# Patient Record
Sex: FEMALE | Race: WHITE | NOT HISPANIC OR LATINO | Employment: OTHER | URBAN - METROPOLITAN AREA
[De-identification: names, ages, dates, MRNs, and addresses within clinical notes are randomized per-mention and may not be internally consistent; named-entity substitution may affect disease eponyms.]

---

## 2022-06-09 ENCOUNTER — OFFICE VISIT (OUTPATIENT)
Dept: OBSTETRICS AND GYNECOLOGY | Facility: CLINIC | Age: 62
End: 2022-06-09

## 2022-06-09 VITALS
DIASTOLIC BLOOD PRESSURE: 80 MMHG | SYSTOLIC BLOOD PRESSURE: 128 MMHG | WEIGHT: 175.4 LBS | BODY MASS INDEX: 25.98 KG/M2 | HEIGHT: 69 IN

## 2022-06-09 DIAGNOSIS — N95.2 ATROPHY OF VAGINA: ICD-10-CM

## 2022-06-09 DIAGNOSIS — Z01.411 ENCOUNTER FOR GYNECOLOGICAL EXAMINATION WITH ABNORMAL FINDING: Primary | ICD-10-CM

## 2022-06-09 DIAGNOSIS — E28.319 EARLY MENOPAUSE: ICD-10-CM

## 2022-06-09 PROCEDURE — 99386 PREV VISIT NEW AGE 40-64: CPT | Performed by: NURSE PRACTITIONER

## 2022-06-09 RX ORDER — ZOLPIDEM TARTRATE 10 MG/1
0.5 TABLET ORAL NIGHTLY
COMMUNITY
Start: 2022-05-03

## 2022-06-09 RX ORDER — ATORVASTATIN CALCIUM 10 MG/1
1 TABLET, FILM COATED ORAL EVERY OTHER DAY
COMMUNITY
Start: 2022-05-03

## 2022-06-09 RX ORDER — OMEPRAZOLE 20 MG/1
1 CAPSULE, DELAYED RELEASE ORAL DAILY
COMMUNITY
Start: 2022-05-05

## 2022-06-09 RX ORDER — SPIRONOLACTONE 100 MG/1
25 TABLET, FILM COATED ORAL DAILY
COMMUNITY
Start: 2022-05-11

## 2022-06-09 NOTE — PROGRESS NOTES
"Chief Complaint  Payal Hsieh is a 61 y.o.  female presenting for Gynecologic Exam (New G[facility], [facility].  Annual exam. C/O vaginal dryness.)    History of Present Illness  Mrs. Moe is a very pleasant 62yo woman, , who had right breast cancer in .  She chose to have bilateral mastectomies with reconstruction.  She also had chemotherapy.  She was only able to tolerate Tamoxifen x ~ 6 months.  Her CC today is vaginal dryness.  Even with OTC lubricants, still having dyspareunia.    Her colon cancer screening is up to date (has appt next mo).  And she should have a baseline DEXA scan.  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    No Known Allergies      Current Outpatient Medications:   •  atorvastatin (LIPITOR) 10 MG tablet, Take 1 tablet by mouth Every Other Day., Disp: , Rfl:   •  omeprazole (priLOSEC) 20 MG capsule, Take 1 capsule by mouth Daily., Disp: , Rfl:   •  spironolactone (ALDACTONE) 100 MG tablet, Take 25 mg by mouth Daily., Disp: , Rfl:   •  zolpidem (AMBIEN) 10 MG tablet, Take 0.5 tablets by mouth Every Night., Disp: , Rfl:     Past Medical History:   Diagnosis Date   • Cancer (HCC)     Hx Breast Cancer   • Hyperlipidemia    • Menopause         Past Surgical History:   Procedure Laterality Date   • BLADDER INSTILLATION     •  SECTION      x 2   • MASTECTOMY      bilateral       Objective  /80   Ht 175.3 cm (69\")   Wt 79.6 kg (175 lb 6.4 oz)   Breastfeeding No   BMI 25.90 kg/m²     Physical Exam  Exam conducted with a chaperone present.   Constitutional:       Appearance: Normal appearance.   HENT:      Head: Normocephalic.   Neck:      Thyroid: No thyroid mass or thyromegaly.   Cardiovascular:      Rate and Rhythm: Normal rate and regular rhythm.      Heart sounds: Normal heart sounds.   Pulmonary:      Effort: Pulmonary effort is normal.      Breath " sounds: Normal breath sounds.   Chest:   Breasts:      Right: No inverted nipple, mass, nipple discharge, axillary adenopathy or supraclavicular adenopathy.      Left: No inverted nipple, mass, nipple discharge, axillary adenopathy or supraclavicular adenopathy.        Comments: Implants noted.  No masses.  No lymphadenopathy.  Abdominal:      Palpations: Abdomen is soft. There is no mass.      Tenderness: There is no abdominal tenderness.   Genitourinary:     General: Normal vulva.      Labia:         Right: No lesion.         Left: No lesion.       Vagina: Erythema present. No vaginal discharge.      Cervix: No discharge, lesion or erythema.      Uterus: Not enlarged and not tender.       Adnexa:         Right: No mass or tenderness.          Left: No mass or tenderness.        Comments: Vaginal mucosa with marked atrophic erythema.    No abnormal vaginal discharge.    Anus appears wnl.  No rectal exam performed.  Lymphadenopathy:      Upper Body:      Right upper body: No supraclavicular or axillary adenopathy.      Left upper body: No supraclavicular or axillary adenopathy.   Neurological:      Mental Status: She is alert.         Assessment/Plan   Diagnoses and all orders for this visit:    1. Encounter for gynecological examination with abnormal finding (Primary)  -     LIQUID-BASED PAP SMEAR, [facility] ([facility])    2. Atrophy of vagina    3. Early menopause  -     DEXA Bone Density Axial; Future    Compounded Estradiol Cream (0.1 mg/ gm)  To use only 0.5 g intravaginally 2x/ wk at HS.    Procedures    40 to 64: Counseling/Anticipatory Guidance Discussed: physical activity, screenings, self-breast exam and Atrophy with info re: local vaginal estrogen tx.     Return in about 3 months (around 9/9/2022) for Recheck 3 mo FU atrophy.    Kenia Coelho, APRN  06/09/2022

## 2022-06-14 LAB — REF LAB TEST METHOD: NORMAL

## 2022-06-15 ENCOUNTER — TELEPHONE (OUTPATIENT)
Dept: OBSTETRICS AND GYNECOLOGY | Facility: CLINIC | Age: 62
End: 2022-06-15

## 2022-06-15 DIAGNOSIS — R87.619 ENDOMETRIAL CELLS ON CERVICAL PAP SMEAR INCONSISTENT W/LMP: Primary | ICD-10-CM

## 2022-06-15 NOTE — TELEPHONE ENCOUNTER
----- Message from DEBBIE Lozada sent at 6/15/2022 12:49 PM EDT -----  Ingrid ---- she did have self id voice mail today, so I left her a detailed msg.  She will need a pelvic US (t-vag) to check the EMS thickness.  (Endometrial cells on pap of 60yo)  She has hx of Tamoxifen use for short time.  No PMB.  Please give her a call with appt date/time.  Thanks!